# Patient Record
Sex: FEMALE | Race: WHITE | NOT HISPANIC OR LATINO | Employment: OTHER | URBAN - METROPOLITAN AREA
[De-identification: names, ages, dates, MRNs, and addresses within clinical notes are randomized per-mention and may not be internally consistent; named-entity substitution may affect disease eponyms.]

---

## 2019-01-16 ENCOUNTER — OFFICE VISIT (OUTPATIENT)
Dept: URGENT CARE | Facility: CLINIC | Age: 64
End: 2019-01-16
Payer: COMMERCIAL

## 2019-01-16 VITALS
SYSTOLIC BLOOD PRESSURE: 151 MMHG | DIASTOLIC BLOOD PRESSURE: 84 MMHG | HEART RATE: 84 BPM | RESPIRATION RATE: 18 BRPM | WEIGHT: 170 LBS | TEMPERATURE: 97 F | BODY MASS INDEX: 32.1 KG/M2 | OXYGEN SATURATION: 98 % | HEIGHT: 61 IN

## 2019-01-16 DIAGNOSIS — M54.32 SCIATICA OF LEFT SIDE: Primary | ICD-10-CM

## 2019-01-16 DIAGNOSIS — M54.42 ACUTE LEFT-SIDED LOW BACK PAIN WITH LEFT-SIDED SCIATICA: ICD-10-CM

## 2019-01-16 PROCEDURE — 3008F PR BODY MASS INDEX (BMI) DOCUMENTED: ICD-10-PCS | Mod: CPTII,S$GLB,, | Performed by: FAMILY MEDICINE

## 2019-01-16 PROCEDURE — 99203 PR OFFICE/OUTPT VISIT, NEW, LEVL III, 30-44 MIN: ICD-10-PCS | Mod: S$GLB,,, | Performed by: FAMILY MEDICINE

## 2019-01-16 PROCEDURE — 99203 OFFICE O/P NEW LOW 30 MIN: CPT | Mod: S$GLB,,, | Performed by: FAMILY MEDICINE

## 2019-01-16 PROCEDURE — 3008F BODY MASS INDEX DOCD: CPT | Mod: CPTII,S$GLB,, | Performed by: FAMILY MEDICINE

## 2019-01-16 RX ORDER — MELOXICAM 15 MG/1
15 TABLET ORAL DAILY
Qty: 14 TABLET | Refills: 0 | Status: SHIPPED | OUTPATIENT
Start: 2019-01-16 | End: 2019-01-30

## 2019-01-16 NOTE — PROGRESS NOTES
"Subjective:       Patient ID: Pete Claros is a 64 y.o. female.    Vitals:  height is 5' 1" (1.549 m) and weight is 77.1 kg (170 lb). Her temperature is 97.4 °F (36.3 °C). Her blood pressure is 151/84 (abnormal) and her pulse is 84. Her respiration is 18 and oxygen saturation is 98%.     Chief Complaint: Hip Pain    Patient has left hip pain and numbness that radiates to lower back. "Feels like someone was driving a hot poker into back". Feels like there is a lump in the front of her hip. Has had this issue before. Taking aleve. No injury.      Hip Pain    The incident occurred 12 to 24 hours ago. There was no injury mechanism. The pain is present in the left hip. Associated symptoms include numbness. She reports no foreign bodies present. She has tried NSAIDs for the symptoms. The treatment provided significant relief.    no vomiting or diarrhea no dysuria or frequency no fever or chills.  No significant abdominal pain associated with eating.  Patient reports the pain sometimes radiates into the left groin and sometimes down the lateral aspect of the left thigh toward the knee.  It is exacerbated by certain leg or back movements.    Constitution: Negative for chills, fatigue and fever.   HENT: Negative for congestion and sore throat.    Neck: Negative for painful lymph nodes.   Cardiovascular: Negative for chest pain and leg swelling.   Eyes: Negative for double vision and blurred vision.   Respiratory: Negative for cough and shortness of breath.    Gastrointestinal: Negative for nausea, vomiting and diarrhea.   Genitourinary: Negative for dysuria, frequency, urgency and history of kidney stones.   Musculoskeletal: Positive for joint pain and muscle cramps. Negative for joint swelling and muscle ache.   Skin: Negative for color change, pale, rash and bruising.   Allergic/Immunologic: Negative for seasonal allergies.   Neurological: Positive for numbness. Negative for dizziness, history of vertigo, light-headedness, " passing out and headaches.   Hematologic/Lymphatic: Negative for swollen lymph nodes.   Psychiatric/Behavioral: Negative for nervous/anxious, sleep disturbance and depression. The patient is not nervous/anxious.        Objective:      Physical Exam   Constitutional: She is oriented to person, place, and time. She appears well-developed and well-nourished. She is cooperative.  Non-toxic appearance. She does not appear ill. No distress.   HENT:   Head: Normocephalic and atraumatic.   Right Ear: Hearing, tympanic membrane, external ear and ear canal normal.   Left Ear: Hearing, tympanic membrane, external ear and ear canal normal.   Nose: Nose normal. No mucosal edema, rhinorrhea or nasal deformity. No epistaxis. Right sinus exhibits no maxillary sinus tenderness and no frontal sinus tenderness. Left sinus exhibits no maxillary sinus tenderness and no frontal sinus tenderness.   Mouth/Throat: Uvula is midline, oropharynx is clear and moist and mucous membranes are normal. No trismus in the jaw. Normal dentition. No uvula swelling. No posterior oropharyngeal erythema.   Eyes: Conjunctivae and lids are normal. Right eye exhibits no discharge. Left eye exhibits no discharge. No scleral icterus.   Sclera clear bilat   Neck: Trachea normal, normal range of motion, full passive range of motion without pain and phonation normal. Neck supple.   Cardiovascular: Normal rate, regular rhythm, normal heart sounds, intact distal pulses and normal pulses.   Pulmonary/Chest: Effort normal and breath sounds normal. No respiratory distress.   Abdominal: Soft. Normal appearance and bowel sounds are normal. She exhibits no distension, no pulsatile midline mass and no mass. There is no tenderness.   Musculoskeletal: Normal range of motion. She exhibits no edema or deformity.   Tenderness in the left sacroiliac joint region.  Palpation in the left groin reveals no masses no induration no adenopathy no evidence of palpable incarcerated or  strangulated hernia.  Lower extremity strength deep tendon reflexes normal straight leg raising is negative.  Her it   Neurological: She is alert and oriented to person, place, and time. She exhibits normal muscle tone. Coordination normal.   Skin: Skin is warm, dry and intact. She is not diaphoretic. No pallor.   Psychiatric: She has a normal mood and affect. Her speech is normal and behavior is normal. Judgment and thought content normal. Cognition and memory are normal.   Nursing note and vitals reviewed.      Assessment:       1. Sciatica of left side    2. Acute left-sided low back pain with left-sided sciatica        Plan:         Sciatica of left side  -     Ambulatory Referral to Family Practice    Acute left-sided low back pain with left-sided sciatica    Other orders  -     meloxicam (MOBIC) 15 MG tablet; Take 1 tablet (15 mg total) by mouth once daily. for 14 days  Dispense: 14 tablet; Refill: 0     Follow with PCP if symptoms persist for additional evaluation and for general family practice and risk factor modification

## 2019-01-16 NOTE — PATIENT INSTRUCTIONS

## 2019-05-14 ENCOUNTER — TELEPHONE (OUTPATIENT)
Dept: URGENT CARE | Facility: CLINIC | Age: 64
End: 2019-05-14

## 2022-11-16 ENCOUNTER — TELEPHONE (OUTPATIENT)
Dept: OPHTHALMOLOGY | Facility: CLINIC | Age: 67
End: 2022-11-16
Payer: MEDICARE

## 2022-11-16 NOTE — TELEPHONE ENCOUNTER
Unable to lvm for pt to schedule appt     -TD     ----- Message from Livia St MA sent at 11/16/2022  1:36 PM CST -----  Contact: @Pete [phone] or  [phone]  Please call pt to schedule appt for glaucoma eval and possible sx. Pt states  told her she needed glaucoma sx.    ----- Message -----  From: Barry Villarreal  Sent: 11/16/2022  12:23 PM CST  To: Grisel[facility]child Staff    Pt  is sending over a referral but the Pt had questions about her eye pressure. Please call back to further assist.

## 2022-11-23 ENCOUNTER — TELEPHONE (OUTPATIENT)
Dept: OPHTHALMOLOGY | Facility: CLINIC | Age: 67
End: 2022-11-23
Payer: MEDICARE

## 2022-11-23 NOTE — TELEPHONE ENCOUNTER
----- Message from Kathryn Plummer sent at 11/23/2022  2:49 PM CST -----  Contact: Self  Type:  Patient Returning Call    Who Called:patient  Who Left Message for Patient:Nadya  Does the patient know what this is regarding?:returning her call  Would the patient rather a call back or a response via [facility]? call  Best Call Back Number:[phone] or [phone]    Additional Information: pt states nobody has returned her call until today. I informed her Nadya called her on 11/16 and today. Pt is requesting call back.

## 2022-11-23 NOTE — TELEPHONE ENCOUNTER
Lvm for pt to call back to schedule, unable to contact pt on either number in chart     -TD        ----- Message from Brando Perez sent at 11/23/2022 12:20 PM CST -----      Name of Who is Calling:PT          What is the request in detail:PT is requesting a call back to discuss possible appointment to have surgery, she states she has left several messages for someone to reach her, she also states that she is going blind and would really like for someone to contact her.          Can the clinic reply by [facility]:no        What Number to Call Back if not in [phone]

## 2022-11-28 ENCOUNTER — TELEPHONE (OUTPATIENT)
Dept: OPHTHALMOLOGY | Facility: CLINIC | Age: 67
End: 2022-11-28
Payer: MEDICARE

## 2023-01-23 ENCOUNTER — TELEPHONE (OUTPATIENT)
Dept: OPHTHALMOLOGY | Facility: CLINIC | Age: 68
End: 2023-01-23
Payer: MEDICARE

## 2023-01-23 NOTE — TELEPHONE ENCOUNTER
Spoke to pt and rescheduled appt for next available, added appt to wait list     -TD     ----- Message from Cristal Waterman sent at 1/23/2023 11:23 AM CST -----  Contact: PT  Type: RESCHEDULE Apoointment Request    Caller is requesting a sooner appointment.  Caller declined first available appointment listed below.  Caller will not accept being placed on the waitlist and is requesting a message be sent to doctor.  Name of Caller:PT   When is the first available appointment?SCHEDULED FOR 01/24/23  Symptoms: glaucoma eval   Would the patient rather a call back or a response via [facility]? CALL   Best Call Back Number:[phone] (home)     Additional Information: SCHEDULE  CONFLICT - PT NEEDS A EARLY MORNING APPT AS EARLY AS POSSIBLE... 1ST APPT , IF POSSIBLE.  PT WANTS JADEN... WILL WAIT.

## 2023-03-01 ENCOUNTER — TELEPHONE (OUTPATIENT)
Dept: OPHTHALMOLOGY | Facility: CLINIC | Age: 68
End: 2023-03-01
Payer: MEDICARE

## 2023-03-01 NOTE — TELEPHONE ENCOUNTER
Explained to patient since we have not seen her before, we cannot advise medically over the phone.   Patient very adamant that we give medical advice, explained we have not seen her and do not have her records, cannot give advice until she is seen.     -TD       ----- Message from Petty Jose sent at 3/1/2023  2:53 PM CST -----  Contact: [phone]  Type: Needs Medical Advice  Who Called:  Pt     Best Call Back Number: [phone]    Additional Information: Pt stated she took two Aleve and her right eye became cloudy and takes a week to clear up. Pt asking if aleve can cause this and if so what do you recommend she take for back pain that will not mess up her right eye.  Pt was also taking prednisone. Pls call back and advise        [id number] - [address] [address]  Phone: [phone] Fax: [phone]

## 2023-03-01 NOTE — TELEPHONE ENCOUNTER
Spoke with pt about pain medication affecting glaucoma. Pt wanted us to give her medical advise on the phone without having seen pt. Advised pt that she will get to ask all questions when she sees dr jay at appointment. That I would reach out to dr ajy and see if he can answer some questions prior to exam

## 2023-04-28 ENCOUNTER — OFFICE VISIT (OUTPATIENT)
Dept: OPHTHALMOLOGY | Facility: CLINIC | Age: 68
End: 2023-04-28
Payer: MEDICARE

## 2023-04-28 DIAGNOSIS — H40.1134 PRIMARY OPEN-ANGLE GLAUCOMA, BILATERAL, INDETERMINATE STAGE: Primary | ICD-10-CM

## 2023-04-28 DIAGNOSIS — H25.13 NUCLEAR SCLEROTIC CATARACT, BILATERAL: ICD-10-CM

## 2023-04-28 DIAGNOSIS — H35.3131 EARLY DRY STAGE NONEXUDATIVE AGE-RELATED MACULAR DEGENERATION OF BOTH EYES: ICD-10-CM

## 2023-04-28 PROCEDURE — 99204 PR OFFICE/OUTPT VISIT, NEW, LEVL IV, 45-59 MIN: ICD-10-PCS | Mod: S$PBB,,, | Performed by: OPHTHALMOLOGY

## 2023-04-28 PROCEDURE — 99999 PR PBB SHADOW E&M-EST. PATIENT-LVL II: CPT | Mod: PBBFAC,,, | Performed by: OPHTHALMOLOGY

## 2023-04-28 PROCEDURE — 92020 PR SPECIAL EYE EVAL,GONIOSCOPY: ICD-10-PCS | Mod: S$PBB,,, | Performed by: OPHTHALMOLOGY

## 2023-04-28 PROCEDURE — 76514 ECHO EXAM OF EYE THICKNESS: CPT | Mod: 26,S$PBB,, | Performed by: OPHTHALMOLOGY

## 2023-04-28 PROCEDURE — 76514 ECHO EXAM OF EYE THICKNESS: CPT | Mod: PBBFAC,PO | Performed by: OPHTHALMOLOGY

## 2023-04-28 PROCEDURE — 99204 OFFICE O/P NEW MOD 45 MIN: CPT | Mod: S$PBB,,, | Performed by: OPHTHALMOLOGY

## 2023-04-28 PROCEDURE — 99999 PR PBB SHADOW E&M-EST. PATIENT-LVL II: ICD-10-PCS | Mod: PBBFAC,,, | Performed by: OPHTHALMOLOGY

## 2023-04-28 PROCEDURE — 76514 PR  US, EYE, FOR CORNEAL THICKNESS: ICD-10-PCS | Mod: 26,S$PBB,, | Performed by: OPHTHALMOLOGY

## 2023-04-28 PROCEDURE — 92020 GONIOSCOPY: CPT | Mod: S$PBB,,, | Performed by: OPHTHALMOLOGY

## 2023-04-28 PROCEDURE — 92020 GONIOSCOPY: CPT | Mod: PBBFAC,PO | Performed by: OPHTHALMOLOGY

## 2023-04-28 PROCEDURE — 92133 POSTERIOR SEGMENT OCT OPTIC NERVE(OCULAR COHERENCE TOMOGRAPHY) - OU - BOTH EYES: ICD-10-PCS | Mod: 26,S$PBB,, | Performed by: OPHTHALMOLOGY

## 2023-04-28 PROCEDURE — 92133 CPTRZD OPH DX IMG PST SGM ON: CPT | Mod: PBBFAC,PO | Performed by: OPHTHALMOLOGY

## 2023-04-28 PROCEDURE — 99212 OFFICE O/P EST SF 10 MIN: CPT | Mod: PBBFAC,PO | Performed by: OPHTHALMOLOGY

## 2023-04-28 NOTE — PROGRESS NOTES
"HPI    Pt presents for 2nd opinion of glaucoma. Was diagnosed about 1.5 years   ago. States she was told she needed a shunt placed OD     States when she takes aleve, the vision OD gets very blurry to a point of   "miracle out" the day after, states it would last for weeks. FB sensation.   Pt states she took prednisone for her back twice, but states it made the   vision very hazy. Has since d/c both meds and has not had these issues.       Cosopt BID OU   Lumigan QHS OU   Brim 1-2x daily OU     Hx of laser OD 2021  Lasik Oux 20 years ago   Last edited by Nadya Wong on 4/28/2023  8:14 AM.            Assessment /Plan     For exam results, see Encounter Report.    Primary open-angle glaucoma, bilateral, indeterminate stage    Early dry stage nonexudative age-related macular degeneration of both eyes    Nuclear sclerotic cataract, bilateral      1. Primary open-angle glaucoma, bilateral, indeterminate stage  +famhx  Avg pachy, post LASIK    OCT NFL severe damage OD, mod damage OS  Hx SLT OD, with K edema after  Has difficulty with preservatives  ?history of anti-VEGF OS  Tmax 36/45 (per patient)    My IOP measurement is okay today  Need old records  Query IOP spikes causing damage  At this time, I don't recommend any changes    Continue  Dorz/fred PF BID OU  Brim bid OU  Lumigan QHS OU    F/u 4 months, HVF, IOP check  Request old records    2. Early dry stage nonexudative age-related macular degeneration of both eyes  Following with Dr. Monte  Review of 1 record shows dry AMD OD, wet AMD OS, unclear if patient has ever received anti-VEGF OS    3. Nuclear sclerotic cataract, bilateral  Mild  Observe                     "

## 2023-05-08 ENCOUNTER — TELEPHONE (OUTPATIENT)
Dept: OPHTHALMOLOGY | Facility: CLINIC | Age: 68
End: 2023-05-08
Payer: MEDICARE

## 2023-05-08 NOTE — TELEPHONE ENCOUNTER
Spoke to pt and rescheduled appt   ----- Message from Luci Norris sent at 5/8/2023 10:28 AM CDT -----  Contact: pt  Type: Needs Medical Advice  Who Called:  pt     Best Call Back Number: [phone]    Additional Information: pt needs to reschedule visit for today. Please advise.

## 2023-05-15 ENCOUNTER — CLINICAL SUPPORT (OUTPATIENT)
Dept: OPHTHALMOLOGY | Facility: CLINIC | Age: 68
End: 2023-05-15
Payer: MEDICARE

## 2023-05-15 DIAGNOSIS — H40.1134 PRIMARY OPEN-ANGLE GLAUCOMA, BILATERAL, INDETERMINATE STAGE: ICD-10-CM

## 2023-05-15 NOTE — PROGRESS NOTES
24-2 HVF done.         Assessment /Plan     For exam results, see Encounter Report.    There are no diagnoses linked to this encounter.

## 2023-06-22 ENCOUNTER — OFFICE VISIT (OUTPATIENT)
Dept: OPHTHALMOLOGY | Facility: CLINIC | Age: 68
End: 2023-06-22
Payer: MEDICARE

## 2023-06-22 DIAGNOSIS — H40.1122 PRIMARY OPEN-ANGLE GLAUCOMA, LEFT EYE, MODERATE STAGE: ICD-10-CM

## 2023-06-22 DIAGNOSIS — H40.1113 PRIMARY OPEN-ANGLE GLAUCOMA, RIGHT EYE, SEVERE STAGE: Primary | ICD-10-CM

## 2023-06-22 PROCEDURE — 99214 PR OFFICE/OUTPT VISIT, EST, LEVL IV, 30-39 MIN: ICD-10-PCS | Mod: S$PBB,,, | Performed by: OPHTHALMOLOGY

## 2023-06-22 PROCEDURE — 99214 OFFICE O/P EST MOD 30 MIN: CPT | Mod: S$PBB,,, | Performed by: OPHTHALMOLOGY

## 2023-06-22 PROCEDURE — 99211 OFF/OP EST MAY X REQ PHY/QHP: CPT | Mod: PBBFAC,PO | Performed by: OPHTHALMOLOGY

## 2023-06-22 PROCEDURE — 99999 PR PBB SHADOW E&M-EST. PATIENT-LVL I: CPT | Mod: PBBFAC,,, | Performed by: OPHTHALMOLOGY

## 2023-06-22 PROCEDURE — 99999 PR PBB SHADOW E&M-EST. PATIENT-LVL I: ICD-10-PCS | Mod: PBBFAC,,, | Performed by: OPHTHALMOLOGY

## 2023-06-22 RX ORDER — DORZOLAMIDE HYDROCHLORIDE AND TIMOLOL MALEATE 20; 5 MG/ML; MG/ML
1 SOLUTION/ DROPS OPHTHALMIC 2 TIMES DAILY
COMMUNITY

## 2023-06-22 RX ORDER — BRIMONIDINE TARTRATE 1.5 MG/ML
1 SOLUTION/ DROPS OPHTHALMIC 3 TIMES DAILY
COMMUNITY

## 2023-06-22 RX ORDER — BIMATOPROST 0.3 MG/ML
1 SOLUTION/ DROPS OPHTHALMIC NIGHTLY
COMMUNITY

## 2023-06-22 NOTE — PROGRESS NOTES
HPI    Pt here for Rev HVF and IOP ck     Pt states she has not been having any new complaints. Still mentioning   that she can not take advil cold and sinus or aleve. Makes OD vision foggy   and white out.     Systane OU several times a day  Cosopt OU BID   Lumigan OU QHS   Brimonidine 1-2 x daily OU.   Last edited by Ally Whitney on 6/22/2023  2:04 PM.            Assessment /Plan     For exam results, see Encounter Report.    Primary open-angle glaucoma, right eye, severe stage    Primary open-angle glaucoma, left eye, moderate stage      1. Primary open-angle glaucoma, right eye, severe stage  2. Primary open-angle glaucoma, left eye, moderate stage  +famhx  Avg pachy, post LASIK    OCT NFL severe damage OD, mod damage OS  HVF sev damage OD, mod damage OS  Hx SLT OD, with K edema after  Has difficulty with preservatives  ?history of anti-VEGF OS  Tmax 36/45 (per patient)  Pt reports white out of vision OD with NSAID use, unclear cause    IOP adequately controlled  Query IOP spikes causing damage    Patient can come in during white-out episode to eval eyes and IOP    Continue  Dorz/fred PF BID OU  Brim bid OU  Lumigan QHS OU    F/u 4 months, IOP check

## 2023-10-23 ENCOUNTER — OFFICE VISIT (OUTPATIENT)
Dept: OPHTHALMOLOGY | Facility: CLINIC | Age: 68
End: 2023-10-23
Payer: MEDICARE

## 2023-10-23 DIAGNOSIS — H04.123 DRY EYE SYNDROME, BILATERAL: ICD-10-CM

## 2023-10-23 DIAGNOSIS — H40.1122 PRIMARY OPEN-ANGLE GLAUCOMA, LEFT EYE, MODERATE STAGE: ICD-10-CM

## 2023-10-23 DIAGNOSIS — H40.1113 PRIMARY OPEN-ANGLE GLAUCOMA, RIGHT EYE, SEVERE STAGE: Primary | ICD-10-CM

## 2023-10-23 PROCEDURE — 99212 OFFICE O/P EST SF 10 MIN: CPT | Mod: PBBFAC,PO | Performed by: OPHTHALMOLOGY

## 2023-10-23 PROCEDURE — 99213 OFFICE O/P EST LOW 20 MIN: CPT | Mod: S$PBB,,, | Performed by: OPHTHALMOLOGY

## 2023-10-23 PROCEDURE — 99999 PR PBB SHADOW E&M-EST. PATIENT-LVL II: CPT | Mod: PBBFAC,,, | Performed by: OPHTHALMOLOGY

## 2023-10-23 PROCEDURE — 99999 PR PBB SHADOW E&M-EST. PATIENT-LVL II: ICD-10-PCS | Mod: PBBFAC,,, | Performed by: OPHTHALMOLOGY

## 2023-10-23 PROCEDURE — 99213 PR OFFICE/OUTPT VISIT, EST, LEVL III, 20-29 MIN: ICD-10-PCS | Mod: S$PBB,,, | Performed by: OPHTHALMOLOGY

## 2023-10-23 NOTE — PROGRESS NOTES
HPI    Pt presents for IOP check     States no current ocular complaints.     States eyes get irritated and dry and will use the glaucoma drops TID   instead of BID     Dorz/Valdez BID OU   Brim BID OU   Lumigan QHS OU   ATS PRN OU       Last edited by Nadya Wong on 10/23/2023 10:57 AM.            Assessment /Plan     For exam results, see Encounter Report.    Primary open-angle glaucoma, right eye, severe stage    Primary open-angle glaucoma, left eye, moderate stage    Dry eye syndrome, bilateral      1. Primary open-angle glaucoma, right eye, severe stage  2. Primary open-angle glaucoma, left eye, moderate stage  +famhx  Avg pachy, post LASIK    OCT NFL severe damage OD, mod damage OS  HVF sev damage OD, mod damage OS  Hx SLT OD, with K edema after  Has difficulty with preservatives  ?history of anti-VEGF OS  Tmax 36/45 (per patient)  Pt reports white out of vision OD with NSAID use, unclear cause    IOP excellent    Continue  Dorz/valdez PF BID OU  Brim bid OU  Lumigan QHS OU    F/u 4-5 months, IOP check, HVF    3. Dry eye syndrome, bilateral  Continue artificial tears

## 2024-02-26 ENCOUNTER — OFFICE VISIT (OUTPATIENT)
Dept: OPHTHALMOLOGY | Facility: CLINIC | Age: 69
End: 2024-02-26
Payer: MEDICARE

## 2024-02-26 ENCOUNTER — CLINICAL SUPPORT (OUTPATIENT)
Dept: OPHTHALMOLOGY | Facility: CLINIC | Age: 69
End: 2024-02-26
Payer: MEDICARE

## 2024-02-26 DIAGNOSIS — H40.1122 PRIMARY OPEN-ANGLE GLAUCOMA, LEFT EYE, MODERATE STAGE: ICD-10-CM

## 2024-02-26 DIAGNOSIS — H40.1134 PRIMARY OPEN-ANGLE GLAUCOMA, BILATERAL, INDETERMINATE STAGE: ICD-10-CM

## 2024-02-26 DIAGNOSIS — H40.1113 PRIMARY OPEN-ANGLE GLAUCOMA, RIGHT EYE, SEVERE STAGE: Primary | ICD-10-CM

## 2024-02-26 PROCEDURE — 99213 OFFICE O/P EST LOW 20 MIN: CPT | Mod: S$PBB,,, | Performed by: OPHTHALMOLOGY

## 2024-02-26 PROCEDURE — 99999 PR PBB SHADOW E&M-EST. PATIENT-LVL II: CPT | Mod: PBBFAC,,, | Performed by: OPHTHALMOLOGY

## 2024-02-26 PROCEDURE — 99212 OFFICE O/P EST SF 10 MIN: CPT | Mod: PBBFAC,PO | Performed by: OPHTHALMOLOGY

## 2024-02-26 NOTE — PROGRESS NOTES
HPI    Pt presents for HVF and IOP     States drops make her eyes irritated.     Dorz/fred  PF BID OU   Brim BID OU   Lumigan QHS OU   ATS PRN OU     Last edited by Nadya Wong on 2/26/2024 11:06 AM.            Assessment /Plan     For exam results, see Encounter Report.    Primary open-angle glaucoma, right eye, severe stage    Primary open-angle glaucoma, left eye, moderate stage      +famhx  Avg pachy, post LASIK    OCT NFL severe damage OD, mod damage OS  HVF sev damage OD, mod damage OS  Hx SLT OD, with K edema after  Has difficulty with preservatives  ?history of anti-VEGF OS  Tmax 36/45 (per patient)  Pt reports white out of vision OD with NSAID use, unclear cause    IOP reasonable    Continue  Dorz/fred PF BID OU  Brim bid OU  Lumigan QHS OU    F/u 4 months, IOP check, DFE, OCT NFL

## 2024-07-08 ENCOUNTER — OFFICE VISIT (OUTPATIENT)
Dept: OPHTHALMOLOGY | Facility: CLINIC | Age: 69
End: 2024-07-08
Payer: MEDICARE

## 2024-07-08 DIAGNOSIS — H04.123 DRY EYE SYNDROME, BILATERAL: ICD-10-CM

## 2024-07-08 DIAGNOSIS — H40.1122 PRIMARY OPEN-ANGLE GLAUCOMA, LEFT EYE, MODERATE STAGE: ICD-10-CM

## 2024-07-08 DIAGNOSIS — H40.1113 PRIMARY OPEN-ANGLE GLAUCOMA, RIGHT EYE, SEVERE STAGE: Primary | ICD-10-CM

## 2024-07-08 PROCEDURE — 99212 OFFICE O/P EST SF 10 MIN: CPT | Mod: PBBFAC,PO,25 | Performed by: OPHTHALMOLOGY

## 2024-07-08 PROCEDURE — 99213 OFFICE O/P EST LOW 20 MIN: CPT | Mod: S$PBB,,, | Performed by: OPHTHALMOLOGY

## 2024-07-08 PROCEDURE — 92133 CPTRZD OPH DX IMG PST SGM ON: CPT | Mod: PBBFAC,PO | Performed by: OPHTHALMOLOGY

## 2024-07-08 PROCEDURE — G2211 COMPLEX E/M VISIT ADD ON: HCPCS | Mod: S$PBB,,, | Performed by: OPHTHALMOLOGY

## 2024-07-08 PROCEDURE — 99999 PR PBB SHADOW E&M-EST. PATIENT-LVL II: CPT | Mod: PBBFAC,,, | Performed by: OPHTHALMOLOGY

## 2024-07-08 NOTE — PROGRESS NOTES
HPI    Pt presents for dfe and oct     States no current ocular complaints,     Dorz/fred PF BID OU   Brim BID OU   Lumigan QHS OU   ATS PRN OU  Last edited by Nadya Wong on 7/8/2024  8:19 AM.            Assessment /Plan     For exam results, see Encounter Report.    Primary open-angle glaucoma, right eye, severe stage    Primary open-angle glaucoma, left eye, moderate stage    Dry eye syndrome, bilateral      1. Primary open-angle glaucoma, right eye, severe stage  2. Primary open-angle glaucoma, left eye, moderate stage  +famhx  Avg pachy, post LASIK    OCT NFL severe damage OD, mod damage OS, ?progression OS vs reduction in swelling  HVF sev damage OD, mod damage OS  Hx SLT OD, with K edema after  Has difficulty with preservatives  ?history of anti-VEGF OS  Tmax 36/45 (per patient)  Pt reports white out of vision OD with NSAID use, unclear cause    IOP excellent today, post dilation    Continue  Dorz/fred PF BID OU  Brim bid OU  Lumigan QHS OU    F/u 4 months, IOP check    Visit today is associated with current or anticipated ongoing medical care related to this patients single serious and complex condition, glaucoma.      3. Dry eye syndrome, bilateral  Recommend NPATs at least QID OU  Could consider restasis in the future

## 2024-10-29 ENCOUNTER — OFFICE VISIT (OUTPATIENT)
Dept: OPHTHALMOLOGY | Facility: CLINIC | Age: 69
End: 2024-10-29
Payer: MEDICARE

## 2024-10-29 DIAGNOSIS — H40.1113 PRIMARY OPEN-ANGLE GLAUCOMA, RIGHT EYE, SEVERE STAGE: Primary | ICD-10-CM

## 2024-10-29 DIAGNOSIS — H40.1122 PRIMARY OPEN-ANGLE GLAUCOMA, LEFT EYE, MODERATE STAGE: ICD-10-CM

## 2024-10-29 DIAGNOSIS — H04.123 DRY EYE SYNDROME, BILATERAL: ICD-10-CM

## 2024-10-29 PROCEDURE — 99212 OFFICE O/P EST SF 10 MIN: CPT | Mod: PBBFAC,PO | Performed by: OPHTHALMOLOGY

## 2024-10-29 PROCEDURE — 99999 PR PBB SHADOW E&M-EST. PATIENT-LVL II: CPT | Mod: PBBFAC,,, | Performed by: OPHTHALMOLOGY

## 2024-10-29 PROCEDURE — G2211 COMPLEX E/M VISIT ADD ON: HCPCS | Mod: S$PBB,,, | Performed by: OPHTHALMOLOGY

## 2024-10-29 PROCEDURE — 99213 OFFICE O/P EST LOW 20 MIN: CPT | Mod: S$PBB,,, | Performed by: OPHTHALMOLOGY

## 2025-03-17 ENCOUNTER — CLINICAL SUPPORT (OUTPATIENT)
Dept: OPHTHALMOLOGY | Facility: CLINIC | Age: 70
End: 2025-03-17
Payer: MEDICARE

## 2025-03-17 ENCOUNTER — OFFICE VISIT (OUTPATIENT)
Dept: OPHTHALMOLOGY | Facility: CLINIC | Age: 70
End: 2025-03-17
Payer: MEDICARE

## 2025-03-17 DIAGNOSIS — H40.1113 PRIMARY OPEN-ANGLE GLAUCOMA, RIGHT EYE, SEVERE STAGE: Primary | ICD-10-CM

## 2025-03-17 DIAGNOSIS — H16.203 KERATOCONJUNCTIVITIS OF BOTH EYES: ICD-10-CM

## 2025-03-17 DIAGNOSIS — H25.13 NUCLEAR SCLEROTIC CATARACT, BILATERAL: ICD-10-CM

## 2025-03-17 DIAGNOSIS — H40.1134 PRIMARY OPEN-ANGLE GLAUCOMA, BILATERAL, INDETERMINATE STAGE: ICD-10-CM

## 2025-03-17 DIAGNOSIS — H40.1122 PRIMARY OPEN-ANGLE GLAUCOMA, LEFT EYE, MODERATE STAGE: ICD-10-CM

## 2025-03-17 PROCEDURE — 99213 OFFICE O/P EST LOW 20 MIN: CPT | Mod: S$PBB,,, | Performed by: OPHTHALMOLOGY

## 2025-03-17 PROCEDURE — G2211 COMPLEX E/M VISIT ADD ON: HCPCS | Mod: S$PBB,,, | Performed by: OPHTHALMOLOGY

## 2025-03-17 PROCEDURE — 99999 PR PBB SHADOW E&M-EST. PATIENT-LVL II: CPT | Mod: PBBFAC,,, | Performed by: OPHTHALMOLOGY

## 2025-03-17 PROCEDURE — 99212 OFFICE O/P EST SF 10 MIN: CPT | Mod: PBBFAC,PO | Performed by: OPHTHALMOLOGY

## 2025-03-17 NOTE — PROGRESS NOTES
HPI    DLS: 10/29/2024- rev HVF/ IOP ck     Pt states OD is a little hazy, thinks it is the cataract. Driving at night   is difficult.   States would like to find an alternative for seeing better while driving   at night. Mentioned a contact lens to help smooth the cornea. Advised pt   to inquire.     Denies pain/ FOL/ floaters.       Dorz/Valdez BID OU   Brim BID OU   Lumigan Qhs OU   Systane 8-9x a day     Last edited by Ally Whitney on 3/17/2025  9:06 AM.            Assessment /Plan     For exam results, see Encounter Report.    Primary open-angle glaucoma, right eye, severe stage    Primary open-angle glaucoma, left eye, moderate stage    Keratoconjunctivitis of both eyes    Nuclear sclerotic cataract, bilateral      1. Primary open-angle glaucoma, right eye, severe stage (Primary)  2. Primary open-angle glaucoma, left eye, moderate stage  +famhx  Avg pachy, post LASIK    OCT NFL severe damage OD, mod damage OS, ?progression OS vs reduction in swelling  HVF sev damage OD, mod damage OS, stable x 2  Hx SLT OD, with K edema after  Has difficulty with preservatives  ?history of anti-VEGF OS  Tmax 36/45 (per patient)  Pt reports white out of vision OD with NSAID use, unclear cause    IOP good considering Tmax    Continue  Dorz/valdez PF BID OU  Brim bid OU  Lumigan QHS OU    F/u 6 months, DFE, OCT NFL    Visit today is associated with current or anticipated ongoing medical care related to this patients single serious and complex condition, glaucoma.     3. Keratoconjunctivitis of both eyes  Continued dryness and symptoms despite frequent NPATs  Pt declines restasis or plugs at this time    4. Nuclear sclerotic cataract, bilateral  Moderate, observe